# Patient Record
Sex: MALE | Race: WHITE
[De-identification: names, ages, dates, MRNs, and addresses within clinical notes are randomized per-mention and may not be internally consistent; named-entity substitution may affect disease eponyms.]

---

## 2020-01-21 ENCOUNTER — HOSPITAL ENCOUNTER (EMERGENCY)
Dept: HOSPITAL 25 - ED | Age: 75
Discharge: HOME | End: 2020-01-21
Payer: MEDICARE

## 2020-01-21 VITALS — SYSTOLIC BLOOD PRESSURE: 155 MMHG | DIASTOLIC BLOOD PRESSURE: 95 MMHG

## 2020-01-21 DIAGNOSIS — K21.9: ICD-10-CM

## 2020-01-21 DIAGNOSIS — R42: ICD-10-CM

## 2020-01-21 DIAGNOSIS — M25.551: ICD-10-CM

## 2020-01-21 DIAGNOSIS — H53.8: Primary | ICD-10-CM

## 2020-01-21 DIAGNOSIS — I10: ICD-10-CM

## 2020-01-21 DIAGNOSIS — Z79.899: ICD-10-CM

## 2020-01-21 LAB
ALBUMIN SERPL BCG-MCNC: 4.2 G/DL (ref 3.2–5.2)
ALBUMIN/GLOB SERPL: 1.7 {RATIO} (ref 1–3)
ALP SERPL-CCNC: 66 U/L (ref 34–104)
ALT SERPL W P-5'-P-CCNC: 65 U/L (ref 7–52)
ANION GAP SERPL CALC-SCNC: 8 MMOL/L (ref 2–11)
APTT PPP: 29.6 SECONDS (ref 26–38)
AST SERPL-CCNC: 22 U/L (ref 13–39)
BASOPHILS # BLD AUTO: 0 10^3/UL (ref 0–0.2)
BUN SERPL-MCNC: 25 MG/DL (ref 6–24)
BUN/CREAT SERPL: 22.3 (ref 8–20)
CALCIUM SERPL-MCNC: 9.6 MG/DL (ref 8.6–10.3)
CHLORIDE SERPL-SCNC: 105 MMOL/L (ref 101–111)
CHOLEST SERPL-MCNC: 169 MG/DL
EOSINOPHIL # BLD AUTO: 0 10^3/UL (ref 0–0.6)
GLOBULIN SER CALC-MCNC: 2.5 G/DL (ref 2–4)
GLUCOSE SERPL-MCNC: 155 MG/DL (ref 70–100)
HCO3 SERPL-SCNC: 26 MMOL/L (ref 22–32)
HCT VFR BLD AUTO: 40 % (ref 42–52)
HDLC SERPL-MCNC: 42.6 MG/DL
HGB BLD-MCNC: 14.2 G/DL (ref 14–18)
INR PPP/BLD: 0.94 (ref 0.82–1.09)
LYMPHOCYTES # BLD AUTO: 1 10^3/UL (ref 1–4.8)
MCH RBC QN AUTO: 31 PG (ref 27–31)
MCHC RBC AUTO-ENTMCNC: 35 G/DL (ref 31–36)
MCV RBC AUTO: 89 FL (ref 80–94)
MONOCYTES # BLD AUTO: 0.7 10^3/UL (ref 0–0.8)
NEUTROPHILS # BLD AUTO: 8.3 10^3/UL (ref 1.5–7.7)
NRBC # BLD AUTO: 0 10^3/UL
NRBC BLD QL AUTO: 0
PLATELET # BLD AUTO: 217 10^3/UL (ref 150–450)
POTASSIUM SERPL-SCNC: 4.4 MMOL/L (ref 3.5–5)
PROT SERPL-MCNC: 6.7 G/DL (ref 6.4–8.9)
RBC # BLD AUTO: 4.53 10^6 /UL (ref 4.18–5.48)
SODIUM SERPL-SCNC: 139 MMOL/L (ref 135–145)
TRIGL SERPL-MCNC: 160 MG/DL
TROPONIN I SERPL-MCNC: 0.01 NG/ML (ref ?–0.03)
WBC # BLD AUTO: 10.1 10^3/UL (ref 3.5–10.8)

## 2020-01-21 PROCEDURE — 85025 COMPLETE CBC W/AUTO DIFF WBC: CPT

## 2020-01-21 PROCEDURE — 84484 ASSAY OF TROPONIN QUANT: CPT

## 2020-01-21 PROCEDURE — 86901 BLOOD TYPING SEROLOGIC RH(D): CPT

## 2020-01-21 PROCEDURE — 86850 RBC ANTIBODY SCREEN: CPT

## 2020-01-21 PROCEDURE — 70450 CT HEAD/BRAIN W/O DYE: CPT

## 2020-01-21 PROCEDURE — 80053 COMPREHEN METABOLIC PANEL: CPT

## 2020-01-21 PROCEDURE — 80307 DRUG TEST PRSMV CHEM ANLYZR: CPT

## 2020-01-21 PROCEDURE — 71046 X-RAY EXAM CHEST 2 VIEWS: CPT

## 2020-01-21 PROCEDURE — 85610 PROTHROMBIN TIME: CPT

## 2020-01-21 PROCEDURE — 81003 URINALYSIS AUTO W/O SCOPE: CPT

## 2020-01-21 PROCEDURE — 93005 ELECTROCARDIOGRAM TRACING: CPT

## 2020-01-21 PROCEDURE — 85730 THROMBOPLASTIN TIME PARTIAL: CPT

## 2020-01-21 PROCEDURE — 80320 DRUG SCREEN QUANTALCOHOLS: CPT

## 2020-01-21 PROCEDURE — G0480 DRUG TEST DEF 1-7 CLASSES: HCPCS

## 2020-01-21 PROCEDURE — 99283 EMERGENCY DEPT VISIT LOW MDM: CPT

## 2020-01-21 PROCEDURE — 86900 BLOOD TYPING SEROLOGIC ABO: CPT

## 2020-01-21 PROCEDURE — 83605 ASSAY OF LACTIC ACID: CPT

## 2020-01-21 PROCEDURE — 81015 MICROSCOPIC EXAM OF URINE: CPT

## 2020-01-21 PROCEDURE — 80061 LIPID PANEL: CPT

## 2020-01-21 PROCEDURE — 36415 COLL VENOUS BLD VENIPUNCTURE: CPT

## 2020-01-21 NOTE — ED
Neurological HPI





- HPI Summary


HPI Summary: 





This pt is a 75 y/[facility]urry vision since 1100 

today. Pt reports he has been feeling pressure on his eyes. He describes 

associated dizziness, however denies room spinning sensation and more as "not 

focusing well." Pt also took his blood pressure at home and it was 200/100. 

Denies chest pain, SOB, headache, fever. Patient is unable to remember if he 

took his antihypertensive medication today. 


Pt reports he has a pinched nerve on right hip and has been lying on the floor 

on his back for almost 1 week now as his pain is aggravated with movement. 

Currently rates his right hip pain 3/10 in severity. [facility] and has been [facility] Gabap[facility]. Pt reports he has had an XR and has been 

to PT but his pain worsened after PT. Pt has been scheduled for an MRI. 





- History of Current Complaint


Chief Complaint: EDNeurologicalDeficit


Stated Complaint: STROKE SYMPTOMS PER WIFE


Time Seen by Provider: 01/21/20 18:06


Hx Obtained From: Patient


Onset/Duration: Started hours ago, Still Present


Timing: Constant


Onset Severity: Moderate


Current Severity: Mild


Pain Intensity: 3 - right hip


Pain Scale Used: 0-10 Numeric


Character: Visual Changes - blurry


Aggravating: Nothing


Alleviating: Nothing


Associated Signs and Symptoms: Positive: Visual Changes - blurry vision.  

Negative: Fever, Chest Pain, Shortness of Breath





- Allergy/Home Medications


Allergies/Adverse Reactions: 


 Allergies











Allergy/AdvReac Type Severity Reaction Status Date / Time


 


No Known Allergies Allergy   Verified 11/04/15 10:27














PMH/Surg Hx/FS Hx/Imm Hx


Cardiovascular History: Reports: Hx Hypertension


Respiratory History: 


   Denies: Hx Asthma, Hx Chronic Obstructive Pulmonary Disease (COPD)


GI History: Reports: Hx Gastroesophageal Reflux Disease


Infectious Disease History: No


Infectious Disease History: 


   Denies: Traveled Outside the [place] in Last 30 Days





- Family History


Family History: Father with colon CA.  Grandfather with pancreatic CA at age 63.





- Social History


Alcohol Use: Rare


Substance Use Type: Reports: None


Smoking Status (MU): Never Smoked Tobacco





Review of Systems


Negative: Fever, Chills


Positive: Blurred Vision


Negative: Chest Pain


Negative: Shortness Of Breath


Neurological: Other - POSITIVE: dizziness


Negative: Headache


All Other Systems Reviewed And Are Negative: Yes





Physical Exam





- Summary


Physical Exam Summary: 





VITAL SIGNS: Reviewed.


GENERAL: Patient is a well-developed and nourished male who is lying 

comfortable in the stretcher. Patient is not in any acute respiratory distress.


HEAD AND FACE: No signs of trauma. No ecchymosis, hematomas or skull 

depressions. No sinus tenderness.


EYES: PERRLA, EOMI x 2, No injected conjunctiva, no nystagmus.


EARS: Hearing grossly intact. Ear canals and tympanic membranes are within 

normal limits.


MOUTH: Oropharynx within normal limits.


NECK: Supple, trachea is midline, no adenopathy, no JVD, no carotid bruit, no c-

spine tenderness, neck with full ROM.


CHEST: Symmetric, no tenderness at palpation


LUNGS: Clear to auscultation bilaterally. No wheezing or crackles.


CVS: Regular rate and rhythm, S1 and S2 present, no murmurs or gallops 

appreciated.


ABDOMEN: Soft, non-tender. No signs of distention. No rebound, no guarding, and 

no masses palpated. Bowel sounds are normal.


EXTREMITIES: Decreased ROM of right hip secondary to chronic pain and pinched 

nerve.


NEURO: Alert and oriented x 3. No acute neurological deficits. Speech is normal 

and follows commands.


SKIN: Dry and warm


GCS: 15


Triage Information Reviewed: Yes


Vital Signs On Initial Exam: 


 Initial Vitals











Temp Pulse Resp BP Pulse Ox


 


 98.2 F   119   19   164/96   97 


 


 01/21/20 17:38  01/21/20 17:38  01/21/20 17:38  01/21/20 17:38  01/21/20 17:38











Vital Signs Reviewed: Yes





Procedures





- Sedation


Patient Received Moderate/Deep Sedation with Procedure: No





Diagnostics





- Vital Signs


 Vital Signs











  Temp Pulse Resp BP Pulse Ox


 


 01/21/20 17:38  98.2 F  119  19  164/96  97














- Laboratory


Result Diagrams: 


 01/21/20 18:20





 01/21/20 18:20


Lab Statement: Any lab studies that have been ordered have been reviewed, and 

results considered in the medical decision making process.





- Radiology


  ** Chest XR


Radiology Interpretation Completed By: ED Physician


Summary of Radiographic Findings: No acute process.





- CT


  ** Brain CT


CT Interpretation Completed By: Radiologist


Summary of CT Findings: IMPRESSION: No acute intracranial abnormality. Dr. Mcknight has reviewed this report.





- EKG


  ** 18:14


Cardiac Rate: NL - at 97 bpm


EKG Rhythm: Sinus Rhythm


Summary of EKG Findings: EKG at 1814 shows normal sinus rhythm at a rate of 97 

bpm. No ST elevations. ED physician has reviewed and interpreted this EKG.





NIH Scale





- NIH Scale


Level of Consciousness: Alert/Keenly Responsive


Ask Patient the Month and His/Her Age: Both Correct


Ask Pt to Open/Close Eyes and /Release Non-Paretic Hand: Both Correctly


Best Gaze (Only Horizontal Eye Movement): Normal


Visual Field Testing: No Visual Loss


Facial Paresis-Pt to Smile & Close Eyes or Grimace Symmetry: Normal/Symmetrical


Motor Function - Right Arm: No Drift-Holds 10 Seconds


Motor Function - Left Arm: No Drift-Holds 10 Seconds


Motor Function - Right Leg: No Drift-Holds 10 Seconds


Motor Function - Left Leg: No Drift-Holds 10 Seconds


Limb Ataxia-Must be out of Proportion to Weakness Present: Absent


Sensory (Use Pinprick to Test Arms/Legs/Trunk/Face): Normal


Best Language (Describe Picture, Name Items): No Aphasia


Dysarthria (Read Several Words): Normal


Extinction and Inattention: No Abnormality


Total Score: 0





Course/Dx





- Course


Assessment/Plan: This pt is a 75 y/o male [facility]/o blurry vision 

since 1100 today. Pt reports he has been feeling pressure on his eyes. He 

describes associated dizziness, however denies room spinning sensation and more 

as "not focusing well." Pt also took his blood pressure at home and it was 200/

100. Denies chest pain, SOB, headache, fever. Patient is unable to remember if 

he took his antihypertensive medication today, 10 mg.  Pt reports he has a 

pinched nerve on right hip and has been lying on the floor on his back for 

almost 1 week now. Currently rates his right hip pain 3/10 in severity. Pt 

[facility] and has been taking Gabapentin. Pt reports he has 

had an XR and has been to PT but his pain worsened after PT. Pt has been 

scheduled for an MRI.  Blood work without any significant abnormality except 

for BUN 25, glucose 155 and AST of 65.  Troponin is 0.01.  Head CT shows no 

acute intracranial pathology.  Chest x-ray impression: No acute process.  

Patients blood pressure is 153/96 therefore he was given 5 mg of amlodipine 

which is his usual blood pressure medication.  The patient is asymptomatic at 

this time.  Patient was given one dose of Norvasc for his HTN.  At this point, 

I discussed all the findings and test results with the patient. Patient was 

instructed to return to the emergency room immediately if any of the symptoms 

return or worsen. Plan of care was discussed with the patient and patient 

understands and agrees. All questions were answered at patient satisfaction.   

Patient understands and agrees. Neurological exam before discharge: Patient is 

alert and oriented x 3. No acute neurological deficits. Patient's vital signs 

are stable. Patient is to follow up with PCP in the next 2  3 days. They 

understand and agree. The plan of care was discussed with the patient and 

patient understands and agrees with the plan of care.  All questions were 

answered at patient satisfaction.  There were no further complaints or concerns.





- Diagnoses


Provider Diagnoses: 


 Dizziness, Visual changes, Uncontrolled hypertension








Discharge ED





- Sign-Out/Discharge


Documenting (check all that apply): Patient Departure - Discharge home





- Discharge Plan


Condition: Stable


Disposition: HOME


Patient Education Materials:  Hypertension (ED), Blurred Vision (ED), Dizziness 

(ED)


Referrals: 


Vanessa Allen [Primary Care Provider] - 


Additional Instructions: 


FOLLOW UP WITH YOUR PRIMARY CARE PROVIDER IN 2-3 DAYS. 





RETURN TO THE ED FOR ANY NEW OR WORSENING SYMPTOMS.





- Billing Disposition and Condition


Condition: STABLE


Disposition: Home





- Attestation Statements


Document Initiated by Narda: Yes


Documenting Emberibe: Franca Claudio


Provider For Whom Narda is Documenting (Include Credential): Ricardo Mcknight MD


Scribe Attestation: 


Franca WOODS scribed for Ricardo Mcknight MD on 01/21/20 at 2148. 


Scribe Documentation Reviewed: Yes


Provider Attestation: 


The documentation as recorded by the Franca zacarias accurately reflects 

the service I personally performed and the decisions made by me, Ricardo Mcknight MD


Status of Scribe Document: Viewed